# Patient Record
Sex: MALE | URBAN - METROPOLITAN AREA
[De-identification: names, ages, dates, MRNs, and addresses within clinical notes are randomized per-mention and may not be internally consistent; named-entity substitution may affect disease eponyms.]

---

## 2017-09-07 PROBLEM — 428262008 HISTORY OF MALIGNANT NEOPLASM OF PROSTATE: Status: ACTIVE | Noted: 2017-09-07

## 2017-09-07 PROBLEM — 197013009 RADIATION ENTEROCOLITIS: Status: ACTIVE | Noted: 2017-09-07

## 2017-09-07 PROBLEM — 266464001 HEMORRHAGE OF RECTUM AND ANUS: Status: ACTIVE | Noted: 2017-09-07

## 2017-09-07 PROBLEM — 238131007 OVERWEIGHT: Status: ACTIVE | Noted: 2017-09-07

## 2017-09-07 PROBLEM — 428283002 HISTORY OF POLYP OF COLON (SITUATION): Status: ACTIVE | Noted: 2017-09-07

## 2017-12-06 PROBLEM — 442405009 ULCER OF ANORECTAL STRUCTURE: Status: ACTIVE | Noted: 2017-12-06

## 2018-06-26 PROBLEM — 267024001 ABNORMAL WEIGHT LOSS: Status: ACTIVE | Noted: 2018-06-26

## 2018-06-26 PROBLEM — 442076002 EARLY SATIETY: Status: ACTIVE | Noted: 2018-06-26

## 2019-03-28 PROBLEM — 62315008 DIARRHEA: Status: ACTIVE | Noted: 2019-03-28

## 2022-04-30 ENCOUNTER — TELEPHONE ENCOUNTER (OUTPATIENT)
Dept: URBAN - METROPOLITAN AREA CLINIC 121 | Facility: CLINIC | Age: 87
End: 2022-04-30

## 2022-04-30 RX ORDER — CLARITHROMYCIN 500 MG/1
1 TABLET PO BID X 14 DAYS TABLET ORAL
OUTPATIENT
Start: 2018-07-11 | End: 2018-07-25

## 2022-04-30 RX ORDER — AMOXICILLIN 500 MG/1
2 TABLETS PO BID X 14 DAYS TABLET, FILM COATED ORAL
OUTPATIENT
Start: 2018-07-11 | End: 2018-07-25

## 2022-04-30 RX ORDER — OMEPRAZOLE 20 MG/1
1 CAPSULE PO BID X 14 DAYS CAPSULE, DELAYED RELEASE ORAL
OUTPATIENT
Start: 2018-07-11 | End: 2018-07-25

## 2022-05-01 ENCOUNTER — TELEPHONE ENCOUNTER (OUTPATIENT)
Dept: URBAN - METROPOLITAN AREA CLINIC 121 | Facility: CLINIC | Age: 87
End: 2022-05-01

## 2022-05-01 RX ORDER — CHOLECALCIFEROL (VITAMIN D3) 25 MCG
TABLET,CHEWABLE ORAL
Status: ACTIVE | COMMUNITY
Start: 2017-09-07

## 2022-05-01 RX ORDER — ATORVASTATIN CALCIUM 10 MG/1
TABLET, FILM COATED ORAL
Status: ACTIVE | COMMUNITY
Start: 2017-09-07

## 2022-05-01 RX ORDER — HYOSCYAMINE SULFATE 0.12 MG/1
1 TABLET PO Q6H PRN FOR DIARRHEA/ABD PAIN TABLET ORAL
Status: ACTIVE | COMMUNITY
Start: 2019-07-10

## 2022-05-01 RX ORDER — ASCORBIC ACID 500 MG
TABLET,CHEWABLE ORAL
Status: ACTIVE | COMMUNITY
Start: 2017-09-07

## 2022-05-01 RX ORDER — ASPIRIN 81 MG/1
TABLET, DELAYED RELEASE ORAL
Status: ACTIVE | COMMUNITY
Start: 2018-06-26

## 2022-08-27 ENCOUNTER — HOSPITAL ENCOUNTER (OUTPATIENT)
Dept: HOSPITAL 5 - ED | Age: 87
Setting detail: OBSERVATION
LOS: 1 days | Discharge: HOME | End: 2022-08-28
Attending: STUDENT IN AN ORGANIZED HEALTH CARE EDUCATION/TRAINING PROGRAM | Admitting: INTERNAL MEDICINE
Payer: MEDICARE

## 2022-08-27 DIAGNOSIS — Z79.899: ICD-10-CM

## 2022-08-27 DIAGNOSIS — Z86.73: ICD-10-CM

## 2022-08-27 DIAGNOSIS — R29.703: ICD-10-CM

## 2022-08-27 DIAGNOSIS — I63.9: ICD-10-CM

## 2022-08-27 DIAGNOSIS — D64.9: ICD-10-CM

## 2022-08-27 DIAGNOSIS — E83.51: ICD-10-CM

## 2022-08-27 DIAGNOSIS — Z98.890: ICD-10-CM

## 2022-08-27 DIAGNOSIS — E78.5: ICD-10-CM

## 2022-08-27 DIAGNOSIS — G45.9: Primary | ICD-10-CM

## 2022-08-27 LAB
APTT BLD: 30.1 SEC. (ref 24.2–36.6)
BASOPHILS # (AUTO): 0 K/MM3 (ref 0–0.1)
BASOPHILS NFR BLD AUTO: 0.1 % (ref 0–1.8)
BUN SERPL-MCNC: 19 MG/DL (ref 9–20)
BUN/CREAT SERPL: 19 %
CALCIUM SERPL-MCNC: 8.3 MG/DL (ref 8.4–10.2)
EOSINOPHIL # BLD AUTO: 0 K/MM3 (ref 0–0.4)
EOSINOPHIL NFR BLD AUTO: 0.4 % (ref 0–4.3)
HCT VFR BLD CALC: 24.6 % (ref 35.5–45.6)
HEMOLYSIS INDEX: 0
HGB BLD-MCNC: 8 GM/DL (ref 11.8–15.2)
INR PPP: 1.07 (ref 0.87–1.13)
LYMPHOCYTES # BLD AUTO: 0.7 K/MM3 (ref 1.2–5.4)
LYMPHOCYTES NFR BLD AUTO: 7.5 % (ref 13.4–35)
MCHC RBC AUTO-ENTMCNC: 32 % (ref 32–34)
MCV RBC AUTO: 83 FL (ref 84–94)
MONOCYTES # (AUTO): 0.6 K/MM3 (ref 0–0.8)
MONOCYTES % (AUTO): 6.7 % (ref 0–7.3)
PLATELET # BLD: 300 K/MM3 (ref 140–440)
RBC # BLD AUTO: 2.95 M/MM3 (ref 3.65–5.03)

## 2022-08-27 PROCEDURE — 85670 THROMBIN TIME PLASMA: CPT

## 2022-08-27 PROCEDURE — 82607 VITAMIN B-12: CPT

## 2022-08-27 PROCEDURE — 80048 BASIC METABOLIC PNL TOTAL CA: CPT

## 2022-08-27 PROCEDURE — G0378 HOSPITAL OBSERVATION PER HR: HCPCS

## 2022-08-27 PROCEDURE — 70498 CT ANGIOGRAPHY NECK: CPT

## 2022-08-27 PROCEDURE — 36415 COLL VENOUS BLD VENIPUNCTURE: CPT

## 2022-08-27 PROCEDURE — 83550 IRON BINDING TEST: CPT

## 2022-08-27 PROCEDURE — 82747 ASSAY OF FOLIC ACID RBC: CPT

## 2022-08-27 PROCEDURE — 85610 PROTHROMBIN TIME: CPT

## 2022-08-27 PROCEDURE — 96376 TX/PRO/DX INJ SAME DRUG ADON: CPT

## 2022-08-27 PROCEDURE — 96374 THER/PROPH/DIAG INJ IV PUSH: CPT

## 2022-08-27 PROCEDURE — 93005 ELECTROCARDIOGRAM TRACING: CPT

## 2022-08-27 PROCEDURE — 80053 COMPREHEN METABOLIC PANEL: CPT

## 2022-08-27 PROCEDURE — 70496 CT ANGIOGRAPHY HEAD: CPT

## 2022-08-27 PROCEDURE — 96361 HYDRATE IV INFUSION ADD-ON: CPT

## 2022-08-27 PROCEDURE — 85025 COMPLETE CBC W/AUTO DIFF WBC: CPT

## 2022-08-27 PROCEDURE — 99291 CRITICAL CARE FIRST HOUR: CPT

## 2022-08-27 PROCEDURE — 85730 THROMBOPLASTIN TIME PARTIAL: CPT

## 2022-08-27 PROCEDURE — 70450 CT HEAD/BRAIN W/O DYE: CPT

## 2022-08-27 RX ADMIN — FAMOTIDINE SCH MG: 10 INJECTION, SOLUTION INTRAVENOUS at 16:38

## 2022-08-27 RX ADMIN — Medication SCH ML: at 15:44

## 2022-08-27 RX ADMIN — Medication SCH ML: at 22:48

## 2022-08-27 RX ADMIN — Medication SCH: at 22:33

## 2022-08-27 RX ADMIN — FAMOTIDINE SCH MG: 10 INJECTION, SOLUTION INTRAVENOUS at 22:48

## 2022-08-27 NOTE — CONSULTATION
History of Present Illness


History of present illness: 





[facility] Consult Note





# Demographics


Consult Type: Acute Stroke Level 1 (0-4.5 hrs)





Patient Location: Emergency Room





First Name: Sandeep





Last Name: Camila





YOB: 1933





Age: 89





Gender: Male





Facility: [facility]





Time of Initial Page (Eastern Time): 08/27/2022, 10:58





Time of Return Call (Eastern Time): 08/27/2022, 10:58








# HPI


Chief Complaint:


altered mental state


weakness (focal)





History: 89M with recent stroke and distant stroke presents with left-sided 

weakness. Found by daughter on the toilet. EMS called; noted to have slurred 

speech and left sided weakness. Had stroke 1 month ago. Fingerstick 91. LKWT 

1000. Daughter took him to the bathroom to get him cleaned up, stopped moving 

left side normally and was not following commands. On Plavix and Lipitor.








# Scores


Time of exam and NIHSS (Eastern Time): 08/27/2022, 10:58





Level of Consciousness 1a: [0] = Alert; keenly responsive





LOC Questions 1b: [0] = Answers both questions correctly





LOC Commands 1c: [0] = Performs both tasks correctly





Best Gaze 2: [0] = Normal





Visual 3: [0] = No visual loss





Facial Palsy 4: [2] = Partial paralysis





Motor Arm Left 5a: [0] = No drift





Motor Arm Right 5b: [0] = No drift





Motor Leg Left 6a: [1] = Drift





Motor Leg Right 6b: [0] = No drift





Limb Ataxia 7: [0] = Absent





Sensory 8: [0] = Normal





Best Language 9: [0] = No aphasia





Dysarthria 10: [1] = Mild-to-moderate dysarthria





Extinction and Inattention 11: [1] = Visual, tactile, auditory, spatial, or 

personal inattention





NIHSS Total: 5








# Data


Time Head CT personally read by me (Eastern Time): 08/27/2022, 11:15





Head CT:


no bleed


preliminarily reviewed by me, please refer to radiology read for official 

reading


chronic R occipital stroke





CTA Head:


preliminarily reviewed by me, please refer to radiology read for official 

reading


right P1 occlusion





CTA Neck:


patent vessels


preliminarily reviewed by me, please refer to radiology read for official 

reading








# Assessment


Impression:


Ischemic Stroke (Acute)








# Plan


Thrombolytic/Intervention: NOT IV Thrombolysis or IA Intervention candidate





Thrombolytic Exclusion (< 3 hour window):


stroke within 3 months





Intraarterial Exclusion:


non-disabling


minor new symptoms





Target Blood Pressure:


SBP < 220


DBP < 105





Labs:


hemoglobin A1c


lipid panel





Imaging: (urgency: routine):


MRI Brain without contrast





Diagnostic Test:


echo without bubble study





Therapy/Evaluation:


PT/OT evaluation





Medication:





Atorvastatin 80





DVT Prophylaxis:


SCD


chemical DVT prophylaxis





Other:


LDL < 70


If patient has any neurological deterioration please call me back immediately


permissive hypertension


telemetry monitoring


I have discussed my recommendations with the referring provider





Disposition: admit








# Logistics


Telemedicine: Interactive 2 way audio and visual telecommunication technology 

was utilized during this visit








Electronically signed at 08/27/2022 12:29 (Eastern Time) by Sohan Kaufman MD





Medications and Allergies


                                    Allergies











Allergy/AdvReac Type Severity Reaction Status Date / Time


 


No Known Allergies Allergy   Unverified 08/27/22 11:02














Physical Examination





- Vital Signs


Vital Signs: 


                                   Vital Signs











Temp Pulse Resp BP Pulse Ox


 


 98.0 F   42 L  16   155/67   98 


 


 08/27/22 10:56  08/27/22 10:56  08/27/22 10:56  08/27/22 10:56  08/27/22 10:56

## 2022-08-27 NOTE — CAT SCAN REPORT
CTA neck without and with intravenous contrast material



CLINICAL HISTORY:



Left-sided weakness, dysarthria



TECHNIQUE:



Following acquisition of a timing bolus 0.625 mm thick contiguous axial scans were obtained from aort
ic arch to the skull base during rapid bolus intravenous contrast infusion. In addition to evaluation
 of axial source images multiplanar reconstructions were produced and reviewed for this report. 3 richard
ne MIP reconstructions were produced and reviewed.



Contrast dose report:



Omnipaque 350:  100 ml, administered intravenously



All CT examinations performed at this facility utilize modulated dose reduction, iterative reconstruc
tion or weight-based dosing, as appropriate, to obtain a radiation dose which is as low as can reason
ably be achieved.





FINDINGS:



Thoracic aorta:No abnormalities are identified along the course of the thoracic aorta..The origins of
 the great vessels have an unremarkable appearance. Brachiocephalic artery, left common carotid arter
y origin and left subclavian artery all have an unremarkable appearance.



Right carotid artery: Combination of calcified and soft plaque is present at the right carotid bifurc
ation extending into the proximal R ICA. Maximum degree of stenosis is on the order of less than 25%.
 Right common carotid artery and mid and distal cervical segments of the right internal carotid arter
y have an unremarkable appearance.



Left carotid artery: A combination of calcified and soft plaque is observed at the left carotid bulb 
extending into the proximal LICA. There is no associated stenosis. Right common carotid artery and mi
d and distal segments of the LICA have an unremarkable appearance.



Posterior circulation: Mild dominance of the left vertebral artery is noted. Calcified atheroscleroti
c plaque is seen at the origin of both vertebral arteries with no associated stenosis. The V1 and V2 
segments of the vertebral arteries have a normal appearance.



The degree of stenosis, if any, is determined utilizing NASCET like criteria.  In this case there is 
no indication of hemodynamically significant stenosis at the carotid bifurcations or elsewhere.



Evaluation of the nonvascular soft tissue structures reveal no abnormality. There is no indication of
 cervical lymphadenopathy. No abnormalities are seen along the course of the airway. Visualized porti
ons of the parotid glands and the submandibular salivary glands have a normal appearance. Thyroid gla
nd has a normal appearance.



A poorly marginated 14 x 13 x 10 mm groundglass lesion is present in the right upper lobe. Recommend 
baseline chest CT for complete evaluation of the lungs.



Evaluation of the cervical spine revealed no significant abnormalities.         



IMPRESSION:



1. No indication of hemodynamically significant stenosis at the carotid bifurcations or elsewhere.

2. Suggest baseline chest CT at this time.

3. Single incidental pulmonary nodule(s) in the right upper lobe measuring 10 x 14 x 13 mm with subso
lid (ground glass) characteristics. Recommendation according to Fleischner Society 2017 Guidelines: C
T at 6-12 months to confirm persistence, then CT every 2 years until 5 years.







CTA head with intravenous contrast

CLINICAL HISTORY:



Left-sided weakness, dysarthria



TECHNIQUE:



0.625  mm thick contiguous axial scans were obtained from the skull base to the skull vertex during r
apid bolus administration of intravenous contrast material. Multiplanar reconstructions were produced
 in the coronal and sagittal planes. In addition 3 plane MIP instructions were produced and reviewed 
for this report. The axial source images and reconstructed images were reviewed for this report.



CONTRAST DOSE REPORT:



Omnipaque 350: 100 ml administered intravenously.





All CT scans at this location are performed using CT dose reduction for ALARA by means of automated e
xposure control.



FINDINGS:



Internal carotid arteries: Calcified atherosclerotic plaque is seen along the course of the cavernous
 segments of both internal carotid arteries extending up into the communicating segments bilaterally.
 There is no associated hemodynamically significant stenosis.



Middle cerebral arteries:Normal and symmetrical M1 segments of the middle cerebral arteries are demon
strated. No abnormalities are seen on evaluation of the insular or opercular branches.



Anterior cerebral arteries:Bilaterally symmetrical A1 segments are demonstrated. No abnormalities are
 seen along the course of the A2 segments or their visualized pericallosal branches. A small anterior
 communicating artery is identified.



Vertebral arteries:Bilaterally symmetrical vertebral arteries are demonstrated. Both vertebral arteri
es contribute to the basilar artery origin.



Basilar artery:Basilar artery has an unremarkable appearance.



Posterior cerebral arteries:Bilaterally  symmetrical posterior cerebral arteries are identified. A le
ft-sided posterior communicating artery is identified.



[place]:Not intact. see above.



Dural sinuses: Dural venous sinuses are well demonstrated on this exam. There is no evidence of dural
 sinus thrombosis.



IMPRESSION:





1. No indication of hemodynamically significant intracranial stenosis or large vessel occlusion.

















Signer Name: Emanuel Zhang MD 

Signed: 8/27/2022 11:59 AM

Workstation Name: [id number]

## 2022-08-27 NOTE — CAT SCAN REPORT
CT HEAD WITHOUT CONTRAST



INDICATION / CLINICAL INFORMATION:

Left-sided weakness, dysarthria with EMS.



TECHNIQUE:

All CT scans at this location are performed using CT dose reduction for ALARA by means of automated e
xposure control. 



COMPARISON:

None available.



FINDINGS:

HEMORRHAGE: No evidence of intracranial hemorrhage or extra-axial fluid collection.

EXTRA-AXIAL SPACES: Cortical sulci and sylvian fissures are enlarged reflecting a degree of parenchym
al volume loss which is within normal limits for the patient's age of 89 years. Basilar cisterns have
 an unremarkable appearance.

VENTRICULAR SYSTEM: The third and lateral ventricles are enlarged reflecting presence of age related 
parenchymal volume loss.

CEREBRAL PARENCHYMA: Periventricular and deep white matter lucency is observed. This is probably seco
ndary to microvascular ischemic change. Well-circumscribed foci of decreased brain parenchymal attenu
ation are identified in the gangliocapsular regions bilaterally. In addition well-circumscribed lucen
cies are present in the right and left thalami. A similar finding is present in the mid manny. These f
indings likely reflect simple remote small deep infarctions. Additionally noted is encephalomalacia a
long the medial aspect of the right occipital lobe secondary to remote right posterior cerebral arter
y infarction. If acute infarction is of clinical concern consider MRI brain with diffusion-weighted i
maging for further evaluation.

MIDLINE SHIFT OR HERNIATION: There is no mass effect.

CEREBELLUM / BRAINSTEM: Brainstem has an unremarkable appearance. Age related cerebellar atrophy is n
oted.

MIDLINE STRUCTURES:Pituitary gland has an unremarkable appearance. No abnormalities are seen in the p
ineal region.

INTRACRANIAL VESSELS:Calcified atherosclerotic plaque is present along the course of the cavernous se
gments of both internal carotid arteries. Similar findings are seen at the distal vertebral arteries.


CRANIOCERVICAL JUNCTION:No significant abnormality.

ORBITS: visualized portions of the orbits have an unremarkable appearance.

SOFT TISSUES of HEAD: No significant abnormality.

CALVARIUM: Evaluation of bone windows reveals no abnormalities.

PARANASAL SINUSES / MASTOID AIR CELLS: Paranasal sinuses are free from inflammatory mucosal disease. 
Mastoid air cells are normally pneumatized.



IMPRESSION:

1. 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
al lobe.





2. Multifocal bilateral thalamic and basal ganglia small deep infarctions. These are likely remote.





3. If acute infarction is of clinical concern consider MRI brain with diffusion weighted imaging for 
further evaluation.





============================================

CODE STROKE:

Time of Communication (CST/CDT): 1035 Central standard time

Licensed Practitioner Receiving Report: Dr. Tony of the [facility] emergency de
partment. 







============================================



Signer Name: Emanuel Zhang MD 

Signed: 8/27/2022 11:37 AM

Workstation Name: [id number]

## 2022-08-27 NOTE — EMERGENCY DEPARTMENT REPORT
ED Neuro Deficit HPI





- General


Chief Complaint: Neuro Symptoms/Deficit


Stated Complaint: STROKE


Time Seen by Provider: 08/27/22 11:15


Source: family, EMS


Mode of arrival: Stretcher


Limitations: Physical Limitation





- History of Present Illness


Initial Comments: 











As per daughter





89-year-old male with a past medical history of a CVA affecting left eye vision 

many years ago and CVA 1 month ago [place] [facility] [place] to the hospital with 

complaints of strokelike symptoms.  Daughter states patient ambulated normally 

to the bathroom.  She was assisting patient and around 10 AM he began to have 

difficulty standing and became less responsive.  She reports garbled speech and 

left-sided weakness.  Patient had a CVA 1 month ago which he presented with ab

normal speech and was noted to have some left-sided weakness.  Since then 

patient has deteriorated from using a cane to a walker and he has undergone 

several rounds of physical and occupational therapy since discharge from the 

hospital.  Patient was placed on Lipitor and Plavix and has been compliant.  He 

currently is with his daughter since recent stroke.  Patient denies any pain.  

EMS reports left-sided weakness upon their arrival but symptoms are improving











- Related Data


Allergies/Adverse Reactions: 


                                    Allergies











Allergy/AdvReac Type Severity Reaction Status Date / Time


 


No Known Allergies Allergy   Unverified 08/27/22 11:02














ED Review of Systems


ROS: 


Stated complaint: STROKE


Other details as noted in HPI





Comment: All other systems reviewed and negative





ED Neuro Physical Exam





- General


Limitations: Physical Limitation


Suspected Stroke: Yes





- NIHSS


Assessment Interval: Baseline


1a. Level of Consciousness: alert/keenly responsive


1b. LOC Questions: answers both correctly


1c. LOC Commands: performs tasks correctly


2. Best Gaze: normal


3. Visual: no visual loss


4. Facial Palsy: minor paralysis


5b. Motor Arm Right: no drift


5a. Motor Arm Left: no drift


6a. Motor Leg Left: drift


6b. Motor Leg Right: no drift


7. Limb Ataxia: absent


8. Sensory: normal


9. Best Language: no aphasia


10. Dysarthria: mild/moderate dysarthria


11. Extinction/Inattention: no abnormality


Total Score: 3


Stroke Severity: Minor Stroke





- Other


Other exam information: 





General: No acute distress


Head: Atraumatic


Eyes: normal appearance


ENT: Moist mucous membranes


Neck: Normal appearance, no midline tenderness


Chest: Clear to auscultation bilaterally


CV: Regular rate and rhythm


Abdomen: Soft, normal bowel sounds, nontender, nondistended, no rebound or 

guarding


Back: Normal inspection


Extremity: Normal inspection, full range of motion


Neuro: Alert O x 3, no facial asymmetry, speech clear, no gross motor sensory 

deficit


Psych: Appropriate behavior


Skin: No rash





ED Course


                                   Vital Signs











  08/27/22 08/27/22 08/27/22





  10:56 11:45 11:48


 


Temperature 98.0 F  97.8 F


 


Pulse Rate 42 L 73 67


 


Respiratory 16 13 29 H





Rate   


 


Blood Pressure   


 


Blood Pressure 155/67  151/70





[Right]   


 


O2 Sat by Pulse 98  98





Oximetry   














  08/27/22 08/27/22 08/27/22





  11:50 12:01 12:15


 


Temperature   


 


Pulse Rate  61 58 L


 


Respiratory 26 H 27 H 26 H





Rate   


 


Blood Pressure  151/70 164/80


 


Blood Pressure   





[Right]   


 


O2 Sat by Pulse 98 98 96





Oximetry   














  08/27/22 08/27/22 08/27/22





  12:31 12:45 13:01


 


Temperature   


 


Pulse Rate 66 59 L 58 L


 


Respiratory 30 H 25 H 24





Rate   


 


Blood Pressure 164/80 159/71 159/71


 


Blood Pressure   





[Right]   


 


O2 Sat by Pulse 99 99 99





Oximetry   














  08/27/22





  13:15


 


Temperature 


 


Pulse Rate 58 L


 


Respiratory 26 H





Rate 


 


Blood Pressure 162/75


 


Blood Pressure 





[Right] 


 


O2 Sat by Pulse 100





Oximetry 














- Consultations


Consultation #1: 





08/27/22 11:38


Case d/w Dr Parry neurologist. Pt's daughter at the bedside








- Lab Data


Result diagrams: 


                                 08/27/22 12:35





                                 08/27/22 12:35


                                   Lab Results











  08/27/22 08/27/22 08/27/22 Range/Units





  12:35 12:35 12:35 


 


WBC  9.5    (4.5-11.0)  K/mm3


 


RBC  2.95 L    (3.65-5.03)  M/mm3


 


Hgb  8.0 L    (11.8-15.2)  gm/dl


 


Hct  24.6 L    (35.5-45.6)  %


 


MCV  83 L    (84-94)  fl


 


MCH  27 L    (28-32)  pg


 


MCHC  32    (32-34)  %


 


RDW  15.6 H    (13.2-15.2)  %


 


Plt Count  300    (140-440)  K/mm3


 


Lymph % (Auto)  7.5 L    (13.4-35.0)  %


 


Mono % (Auto)  6.7    (0.0-7.3)  %


 


Eos % (Auto)  0.4    (0.0-4.3)  %


 


Baso % (Auto)  0.1    (0.0-1.8)  %


 


Lymph # (Auto)  0.7 L    (1.2-5.4)  K/mm3


 


Mono # (Auto)  0.6    (0.0-0.8)  K/mm3


 


Eos # (Auto)  0.0    (0.0-0.4)  K/mm3


 


Baso # (Auto)  0.0    (0.0-0.1)  K/mm3


 


Seg Neutrophils %  85.3 H    (40.0-70.0)  %


 


Seg Neutrophils #  8.1 H    (1.8-7.7)  K/mm3


 


PT   15.1 H   (12.2-14.9)  Sec.


 


INR   1.07   (0.87-1.13)  


 


APTT   30.1   (24.2-36.6)  Sec.


 


Thrombin Time   18.5   (15.1-19.6)  Sec.


 


Sodium    142  (137-145)  mmol/L


 


Potassium    4.2  (3.6-5.0)  mmol/L


 


Chloride    108.4 H  ()  mmol/L


 


Carbon Dioxide    25  (22-30)  mmol/L


 


Anion Gap    13  mmol/L


 


BUN    19  (9-20)  mg/dL


 


Creatinine    1.0  (0.8-1.3)  mg/dL


 


Estimated GFR    > 60  ml/min


 


BUN/Creatinine Ratio    19  %


 


Glucose    94  ()  mg/dL


 


Calcium    8.3 L  (8.4-10.2)  mg/dL














- EKG Data


-: EKG Interpreted by Me


EKG shows normal: sinus rhythm, ST-T waves (No STEMI)





- Radiology Data


Radiology results: report reviewed


CT HEAD WITHOUT CONTRAST  


 


 INDICATION / CLINICAL INFORMATION:  


 Left-sided weakness, dysarthria with EMS.  


 


 TECHNIQUE:  


 All CT scans at this location are performed using CT dose reduction for ALARA 

by means of automated


exposure control.   


 


 COMPARISON:  


 None available.  


 


 FINDINGS:  


 HEMORRHAGE: No evidence of intracranial hemorrhage or extra-axial fluid 

collection.  


 EXTRA-AXIAL SPACES: Cortical sulci and sylvian fissures are enlarged reflecting

 a degree of 


parenchymal volume loss which is within normal limits for the patient's age of 

89 years. Basilar 


cisterns have an unremarkable appearance.  


 VENTRICULAR SYSTEM: The third and lateral ventricles are enlarged reflecting 

presence of age 


related parenchymal volume loss.  


 CEREBRAL PARENCHYMA: Periventricular and deep white matter lucency is observed.

 This is probably 


secondary to microvascular ischemic change. Well-circumscribed foci of decreased

 brain parenchymal 


attenuation are identified in the gangliocapsular regions bilaterally. In 

addition well-


circumscribed lucencies are present in the right and left thalami. A similar 

finding is present in 


the mid manny. These findings likely reflect simple remote small deep 

infarctions. Additionally noted


is encephalomalacia along the medial aspect of the right occipital lobe 

secondary to remote right 


posterior cerebral artery infarction. If acute infarction is of clinical concern

 consider MRI brain 


with diffusion-weighted imaging for further evaluation.  


 MIDLINE SHIFT OR HERNIATION: There is no mass effect.  


 CEREBELLUM / BRAINSTEM: Brainstem has an unremarkable appearance. Age related 

cerebellar atrophy is


noted.  


 MIDLINE STRUCTURES:Pituitary gland has an unremarkable appearance. No 

abnormalities are seen in the


pineal region.  


 INTRACRANIAL VESSELS:Calcified atherosclerotic plaque is present along the 

course of the cavernous 


segments of both internal carotid arteries. Similar findings are seen at the dis

stacy vertebral 


arteries.  


 CRANIOCERVICAL JUNCTION:No significant abnormality.  


 ORBITS: visualized portions of the orbits have an unremarkable appearance.  


 SOFT TISSUES of HEAD: No significant abnormality.  


 CALVARIUM: Evaluation of bone windows reveals no abnormalities.  


 PARANASAL SINUSES / MASTOID AIR CELLS: Paranasal sinuses are free from 

inflammatory mucosal 


disease. Mastoid air cells are normally pneumatized.  


 


 IMPRESSION:  


 1. Remote right posterior cerebral artery infarction involving the medial 

aspect of the right 


occipital lobe.  


 


 


 2. Multifocal bilateral thalamic and basal ganglia small deep infarctions. 

These are likely remote.  


 


 


 3. If acute infarction is of clinical concern consider MRI brain with diffusion

 weighted imaging 


for further evaluation.  


 


 


 ============================================  


 CODE STROKE:  


 Time of Communication (CST/CDT): 1035 Central standard time  


 Licensed Practitioner Receiving Report: Dr. Tony of the [facility] emergency 


department.   


 


 CTA neck without and with intravenous contrast material  


 


 CLINICAL HISTORY:  


 


 Left-sided weakness, dysarthria  


 


 TECHNIQUE:  


 


 Following acquisition of a timing bolus 0.625 mm thick contiguous axial scans 

were obtained from 


aortic arch to the skull base during rapid bolus intravenous contrast infusion. 

In addition to 


evaluation of axial source images multiplanar reconstructions were produced and 

reviewed for this 


report. 3 plane MIP reconstructions were produced and reviewed.  


 


 Contrast dose report:  


 


 Omnipaque 350:  100 ml, administered intravenously  


 


 All CT examinations performed at this facility utilize modulated dose re

duction, iterative 


reconstruction or weight-based dosing, as appropriate, to obtain a radiation dos

e which is as low as


can reasonably be achieved.  


 


 


 FINDINGS:  


 


 Thoracic aorta:No abnormalities are identified along the course of the thoracic

 aorta..The origins 


of the great vessels have an unremarkable appearance. Brachiocephalic artery, 

left common carotid 


artery origin and left subclavian artery all have an unremarkable appearance.  


 


 Right carotid artery: Combination of calcified and soft plaque is present at 

the right carotid 


bifurcation extending into the proximal R ICA. Maximum degree of stenosis is on 

the order of less 


than 25%. Right common carotid artery and mid and distal cervical segments of 

the right internal 


carotid artery have an unremarkable appearance.  


 


 Left carotid artery: A combination of calcified and soft plaque is observed at 

the left carotid 


bulb extending into the proximal LICA. There is no associated stenosis. Right 

common carotid artery 


and mid and distal segments of the LICA have an unremarkable appearance.  


 


 Posterior circulation: Mild dominance of the left vertebral artery is noted. 

Calcified 


atherosclerotic plaque is seen at the origin of both vertebral arteries with no 

associated stenosis.


The V1 and V2 segments of the vertebral arteries have a normal appearance.  


 


 The degree of stenosis, if any, is determined utilizing NASCET like criteria.  

In this case there 


is no indication of hemodynamically significant stenosis at the carotid 

bifurcations or elsewhere.  


 


 Evaluation of the nonvascular soft tissue structures reveal no abnormality. 

There is no indication 


of cervical lymphadenopathy. No abnormalities are seen along the course of the 

airway. Visualized 


portions of the parotid glands and the submandibular salivary glands have a 

normal appearance. 


Thyroid gland has a normal appearance.  


 


 A poorly marginated 14 x 13 x 10 mm groundglass lesion is present in the right 

upper lobe. 


Recommend baseline chest CT for complete evaluation of the lungs.  


 


 Evaluation of the cervical spine revealed no significant abnormalities.        

   


 


 IMPRESSION:  


 


 1. No indication of hemodynamically significant stenosis at the carotid 

bifurcations or elsewhere.  


 2. Suggest baseline chest CT at this time.  


 3. Single incidental pulmonary nodule(s) in the right upper lobe measuring 10 x

 14 x 13 mm with 


subsolid (ground glass) characteristics. Recommendation according to Fleischner 

Society 2017 


Guidelines: CT at 6-12 months to confirm persistence, then CT every 2 years 

until 5 years.  


 


 


 


 CTA head with intravenous contrast  


 CLINICAL HISTORY:  


 


 Left-sided weakness, dysarthria  


 


 TECHNIQUE:  


 


 0.625  mm thick contiguous axial scans were obtained from the skull base to the

 skull vertex during


rapid bolus administration of intravenous contrast material. Multiplanar 

reconstructions were 


produced in the coronal and sagittal planes. In addition 3 plane MIP 

instructions were produced and 


reviewed for this report. The axial source images and reconstructed images were 

reviewed for this 


report.  


 


 CONTRAST DOSE REPORT:  


 


 Omnipaque 350: 100 ml administered intravenously.  


 


 


 All CT scans at this location are performed using CT dose reduction for ALARA 

by means of automated


exposure control.  


 


 FINDINGS:  


 


 Internal carotid arteries: Calcified atherosclerotic plaque is seen along the 

course of the 


cavernous segments of both internal carotid arteries extending up into the 

communicating segments 


bilaterally. There is no associated hemodynamically significant stenosis.  


 


 Middle cerebral arteries:Normal and symmetrical M1 segments of the middle 

cerebral arteries are 


demonstrated. No abnormalities are seen on evaluation of the insular or 

opercular branches.  


 


 Anterior cerebral arteries:Bilaterally symmetrical A1 segments are 

demonstrated. No abnormalities 


are seen along the course of the A2 segments or their visualized pericallosal 

branches. A small 


anterior communicating artery is identified.  


 


 Vertebral arteries:Bilaterally symmetrical vertebral arteries are demonstrated.

 Both vertebral 


arteries contribute to the basilar artery origin.  


 


 Basilar artery:Basilar artery has an unremarkable appearance.  


 


 Posterior cerebral arteries:Bilaterally  symmetrical posterior cerebral 

arteries are identified. A 


left-sided posterior communicating artery is identified.  


 


 [place]:Not intact. see above.  


 


 Dural sinuses: Dural venous sinuses are well demonstrated on this exam. There 

is no evidence of 


dural sinus thrombosis.  


 


 IMPRESSION:  


 


 


 1. No indication of hemodynamically significant intracranial stenosis or large 

vessel occlusion.  


 


 


 


 





- Medical Decision Making





89-year-old male with sudden onset of strokelike symptoms at approximately 10 

AM.  It does appear the patient has some residual deficits status post recent 

CVA with increased debility requiring walker use.  Patient recently completed 

occupational physical therapy.  Symptoms worsen suddenly at 10 AM as witnessed 

by his daughter at the bedside.  Patient not a tPA candidate secondary to recent

 stroke 1 month ago.  CT head, CT angio head and neck are reviewed.  Neurologist

 consult appreciated.  Please refer to note for further work-up recommendations





- Thrombolytic Inclusion/Exclusion


Thrombolytic Contraindications: Stroke in Past 3 Months


Critical Care Time: Yes


Critical care time in (mins) excluding proc time.: 35


Critical care attestation.: 


If time is entered above; I have spent that time in minutes in the direct care 

of this critically ill patient, excluding procedure time.





Critical Care Time: 





 35 Minutes of critical care time excluding procedures were used in the care of 

the patient.   I came immediately to the bedside upon patient's arrival.  I 

obtained history from EMS at the bedside. I discussed treatment plan with the 

nursing team members. I reviewed electronic record.  I spoke with family to 

obtain medical history. Patient required multiple interventions and 

reassessments.  Spoke with hospitalist and consultants for collaborative care





ED Disposition


Clinical Impression: 


 Stroke syndrome, Anemia, History of CVA (cerebrovascular accident)





Disposition: 09 ADMITTED AS INPATIENT


Is pt being admited?: Yes


Does the pt Need Aspirin: Yes


Condition: Stable


Time of Disposition: 14:08 (Dr Pack/hospitalist)

## 2022-08-27 NOTE — CAT SCAN REPORT
CTA neck without and with intravenous contrast material



CLINICAL HISTORY:



Left-sided weakness, dysarthria



TECHNIQUE:



Following acquisition of a timing bolus 0.625 mm thick contiguous axial scans were obtained from aort
ic arch to the skull base during rapid bolus intravenous contrast infusion. In addition to evaluation
 of axial source images multiplanar reconstructions were produced and reviewed for this report. 3 richard
ne MIP reconstructions were produced and reviewed.



Contrast dose report:



Omnipaque 350:  100 ml, administered intravenously



All CT examinations performed at this facility utilize modulated dose reduction, iterative reconstruc
tion or weight-based dosing, as appropriate, to obtain a radiation dose which is as low as can reason
ably be achieved.





FINDINGS:



Thoracic aorta:No abnormalities are identified along the course of the thoracic aorta..The origins of
 the great vessels have an unremarkable appearance. Brachiocephalic artery, left common carotid arter
y origin and left subclavian artery all have an unremarkable appearance.



Right carotid artery: Combination of calcified and soft plaque is present at the right carotid bifurc
ation extending into the proximal R ICA. Maximum degree of stenosis is on the order of less than 25%.
 Right common carotid artery and mid and distal cervical segments of the right internal carotid arter
y have an unremarkable appearance.



Left carotid artery: A combination of calcified and soft plaque is observed at the left carotid bulb 
extending into the proximal LICA. There is no associated stenosis. Right common carotid artery and mi
d and distal segments of the LICA have an unremarkable appearance.



Posterior circulation: Mild dominance of the left vertebral artery is noted. Calcified atheroscleroti
c plaque is seen at the origin of both vertebral arteries with no associated stenosis. The V1 and V2 
segments of the vertebral arteries have a normal appearance.



The degree of stenosis, if any, is determined utilizing NASCET like criteria.  In this case there is 
no indication of hemodynamically significant stenosis at the carotid bifurcations or elsewhere.



Evaluation of the nonvascular soft tissue structures reveal no abnormality. There is no indication of
 cervical lymphadenopathy. No abnormalities are seen along the course of the airway. Visualized porti
ons of the parotid glands and the submandibular salivary glands have a normal appearance. Thyroid gla
nd has a normal appearance.



A poorly marginated 14 x 13 x 10 mm groundglass lesion is present in the right upper lobe. Recommend 
baseline chest CT for complete evaluation of the lungs.



Evaluation of the cervical spine revealed no significant abnormalities.         



IMPRESSION:



1. No indication of hemodynamically significant stenosis at the carotid bifurcations or elsewhere.

2. Suggest baseline chest CT at this time.

3. Single incidental pulmonary nodule(s) in the right upper lobe measuring 10 x 14 x 13 mm with subso
lid (ground glass) characteristics. Recommendation according to Fleischner Society 2017 Guidelines: C
T at 6-12 months to confirm persistence, then CT every 2 years until 5 years.







CTA head with intravenous contrast

CLINICAL HISTORY:



Left-sided weakness, dysarthria



TECHNIQUE:



0.625  mm thick contiguous axial scans were obtained from the skull base to the skull vertex during r
apid bolus administration of intravenous contrast material. Multiplanar reconstructions were produced
 in the coronal and sagittal planes. In addition 3 plane MIP instructions were produced and reviewed 
for this report. The axial source images and reconstructed images were reviewed for this report.



CONTRAST DOSE REPORT:



Omnipaque 350: 100 ml administered intravenously.





All CT scans at this location are performed using CT dose reduction for ALARA by means of automated e
xposure control.



FINDINGS:



Internal carotid arteries: Calcified atherosclerotic plaque is seen along the course of the cavernous
 segments of both internal carotid arteries extending up into the communicating segments bilaterally.
 There is no associated hemodynamically significant stenosis.



Middle cerebral arteries:Normal and symmetrical M1 segments of the middle cerebral arteries are demon
strated. No abnormalities are seen on evaluation of the insular or opercular branches.



Anterior cerebral arteries:Bilaterally symmetrical A1 segments are demonstrated. No abnormalities are
 seen along the course of the A2 segments or their visualized pericallosal branches. A small anterior
 communicating artery is identified.



Vertebral arteries:Bilaterally symmetrical vertebral arteries are demonstrated. Both vertebral arteri
es contribute to the basilar artery origin.



Basilar artery:Basilar artery has an unremarkable appearance.



Posterior cerebral arteries:Bilaterally  symmetrical posterior cerebral arteries are identified. A le
ft-sided posterior communicating artery is identified.



[place]:Not intact. see above.



Dural sinuses: Dural venous sinuses are well demonstrated on this exam. There is no evidence of dural
 sinus thrombosis.



IMPRESSION:





1. No indication of hemodynamically significant intracranial stenosis or large vessel occlusion.

















Signer Name: Emanuel Zhang MD 

Signed: 8/27/2022 11:59 AM

Workstation Name: [id number]

## 2022-08-28 VITALS — SYSTOLIC BLOOD PRESSURE: 130 MMHG | DIASTOLIC BLOOD PRESSURE: 57 MMHG

## 2022-08-28 LAB
ALBUMIN SERPL-MCNC: 2.6 G/DL (ref 3.9–5)
ALT SERPL-CCNC: 36 UNITS/L (ref 7–56)
BASOPHILS # (AUTO): 0 K/MM3 (ref 0–0.1)
BASOPHILS NFR BLD AUTO: 0.2 % (ref 0–1.8)
BUN SERPL-MCNC: 17 MG/DL (ref 9–20)
BUN/CREAT SERPL: 17 %
CALCIUM SERPL-MCNC: 8.2 MG/DL (ref 8.4–10.2)
EOSINOPHIL # BLD AUTO: 0.1 K/MM3 (ref 0–0.4)
EOSINOPHIL NFR BLD AUTO: 0.7 % (ref 0–4.3)
HCT VFR BLD CALC: 23.5 % (ref 35.5–45.6)
HEMOLYSIS INDEX: 0
HGB BLD-MCNC: 7.6 GM/DL (ref 11.8–15.2)
IRON SERPL-MCNC: 14 UG/DL (ref 49–181)
LYMPHOCYTES # BLD AUTO: 0.9 K/MM3 (ref 1.2–5.4)
LYMPHOCYTES NFR BLD AUTO: 10 % (ref 13.4–35)
MCHC RBC AUTO-ENTMCNC: 32 % (ref 32–34)
MCV RBC AUTO: 82 FL (ref 84–94)
MONOCYTES # (AUTO): 0.8 K/MM3 (ref 0–0.8)
MONOCYTES % (AUTO): 8.4 % (ref 0–7.3)
PLATELET # BLD: 292 K/MM3 (ref 140–440)
RBC # BLD AUTO: 2.88 M/MM3 (ref 3.65–5.03)
TIBC SERPL-MCNC: 123 MCG/DL (ref 250–450)

## 2022-08-28 RX ADMIN — FAMOTIDINE SCH MG: 10 INJECTION, SOLUTION INTRAVENOUS at 12:10

## 2022-08-28 RX ADMIN — Medication SCH ML: at 12:12

## 2022-08-28 NOTE — DISCHARGE SUMMARY
Providers





- Providers


Date of Admission: 


08/27/22 14:09





Date of discharge: 08/28/22


Attending physician: 


FERNY YODER MD





                                        





08/27/22 14:57


Consult to Physician [CONS] Routine 


   Comment: 


   Consulting Provider: LINDA BISHOP


   Physician Instructions: 


   Reason For Exam: TIA











Primary care physician: 


TAMMY YODER








Hospitalization


Reason for admission: TIAruled out, recrudescence


Condition: Stable


Pertinent studies: 





Reviewed.


Procedures: 





None.


Hospital course: 





The patient is an 89-year-old male with a past medical history of a CVA 

affecting left eye vision many years ago and [place] 1 [place] [facility] [place]resents to the hospital with complaints of strokelike symptoms.  Daughter 

states patient ambulated normally to the bathroom.  She was assisting patient 

and around 10 AM he began to have difficulty standing and became less 

responsive.  She reports garbled speech and left-sided weakness.  Patient had a 

CVA 1 month ago which he presented with abnormal speech and was noted to have 

some left-sided weakness.  Since then patient has deteriorated from using a cane

 to a walker and he has undergone several rounds of physical and occupational 

therapy since discharge from the hospital.  Patient was placed on Lipitor and 

Plavix and has been compliant.  He currently is with his daughter since recent 

stroke.  Patient denies any pain.  On presentation to the ED, the patient was 

found to be hemodynamically stable.  Patient underwent CT head noncontrast 

revealing "remote right posterior cerebral artery infarction involving the 

medial aspect of the right occipital lobe; multifocal bilateral thalamic and 

basal ganglia small deep infarctions that are likely remote".  On further 

evaluation the patient's symptoms have since returned back to his baseline. 

Teleneurology was consulted, and they did not recommend the administration of 

tPA.  The patient's symptoms might have been secondary to recrudescence as 

opposed to TIA versus acute ischemic CVA.  Patient is medically clear for 

discharge.


Disposition: 01 HOME / SELF CARE / HOMELESS


Final Discharge Diagnosis (Prints w/discharge instructions): TIAruled out, 

recrudescence, hyperlipidemia, chronic anemia, old cerebrovascular accident, 

hypocalcemia


Time spent for discharge: 45 min 





Core Measure Documentation





- Palliative Care


Palliative Care/ Comfort Measures: Not Applicable





- Core Measures


Any of the following diagnoses?: history only





Exam





- Constitutional


Vitals: 


                                        











Temp Pulse Resp BP Pulse Ox


 


 99.0 F   78   22   130/57   97 


 


 08/28/22 04:30  08/28/22 04:30  08/28/22 04:30  08/28/22 04:30  08/28/22 04:30











General appearance: Present: no acute distress, well-nourished





- EENT


Eyes: Present: PERRL, EOM intact


ENT: hearing intact, clear oral mucosa, dentition normal





- Neck


Neck: Present: supple, normal ROM





- Respiratory


Respiratory effort: normal


Respiratory: bilateral: CTA





- Cardiovascular


Rhythm: regular


Heart Sounds: Present: S1 & S2





- Extremities


Extremities: no ischemia, pulses intact, pulses symmetrical, No edema, normal 

temperature, normal color


Peripheral Pulses: within normal limits





- Abdominal


General gastrointestinal: Present: soft, non-tender, non-distended, normal bowel

 sounds


Male genitourinary: Present: deferred





- Rectal


Rectal Exam: deferred





- Integumentary


Integumentary: Present: clear, warm, dry





- Musculoskeletal


Musculoskeletal: strength equal bilaterally





- Psychiatric


Psychiatric: appropriate mood/affect, memory intact, cooperative





- Neurologic


Neurologic: CNII-XII intact, moves all extremities





- Allied Health


Allied health notes reviewed: nursing





Plan


Activity: advance as tolerated


Diet: low salt


Additional Instructions: The patient is an 89-year-old male with a past medical 

history of a CVA affecting left eye vision many years ago and CVA 1 month ago 

[place] [facility] [place] hospital with complaints of strokelike symptoms. 

 Daughter states patient ambulated normally to the bathroom.  She was assisting 

patient and around 10 AM he began to have difficulty standing and became less 

responsive.  She reports garbled speech and left-sided weakness.  Patient had a 

CVA 1 month ago which he presented with abnormal speech and was noted to have 

some left-sided weakness.  Since then patient has deteriorated from using a cane

 to a walker and he has undergone several rounds of physical and occupational 

therapy since discharge from the hospital.  Patient was placed on Lipitor and 

Plavix and has been compliant.  He currently is with his daughter since recent 

stroke.  Patient denies any pain.  On presentation to the ED, the patient was 

found to be hemodynamically stable.  Patient underwent CT head noncontrast 

revealing "remote right posterior cerebral artery infarction involving the 

medial aspect of the right occipital lobe; multifocal bilateral thalamic and 

basal ganglia small deep infarctions that are likely remote".  On further 

evaluation the patient's symptoms have since returned back to his baseline. 

Teleneurology was consulted, and they did not recommend the administration of 

tPA.  The patient's symptoms might have been secondary to recrudescence as opp

osed to TIA versus acute ischemic CVA.  Patient is medically clear for 

discharge.


Care Plan Goals: 


Patient is medically clear for discharge.


Assessment: 


The patient is an 89-year-old male with a past medical history of a CVA 

affecting left eye vision many years ago and [place]A 1 month ago [place] [facility] [place] 

presents to the hospital with complaints of strokelike symptoms.  Daughter 

states patient ambulated normally to the bathroom.  She was assisting patient 

and around 10 AM he began to have difficulty standing and became less 

responsive.  She reports garbled speech and left-sided weakness.  Patient had a 

CVA 1 month ago which he presented with abnormal speech and was noted to have 

some left-sided weakness.  Since then patient has deteriorated from using a cane

 to a walker and he has undergone several rounds of physical and occupational 

therapy since discharge from the hospital.  Patient was placed on Lipitor and 

Plavix and has been compliant.  He currently is with his daughter since recent 

stroke.  Patient denies any pain.  On presentation to the ED, the patient was 

found to be hemodynamically stable.  Patient underwent CT head noncontrast 

revealing "remote right posterior cerebral artery infarction involving the 

medial aspect of the right occipital lobe; multifocal bilateral thalamic and 

basal ganglia small deep infarctions that are likely remote".  On further 

evaluation the patient's symptoms have since returned back to his baseline. 

Teleneurology was consulted, and they did not recommend the administration of 

tPA.  The patient's symptoms might have been secondary to recrudescence as 

opposed to TIA versus acute ischemic CVA.  Patient is medically clear for 

discharge.


Follow up with: 


TAMMY YODER MD [Primary Care Provider] - 7 Days

## 2022-08-28 NOTE — ELECTROCARDIOGRAPH REPORT
[facility]

                                       

Test Date:    2022               Test Time:    11:36:26

Pat Name:     NIURKA SAENZ               Department:   

Patient ID:   [id number]          Room:         A467

Gender:       M                        Technician:   [id number]

:          1933               Requested By: LULY SHAY

Order Number: [id number]             Reading MD:   Ra Burris

                                 Measurements

Intervals                              Axis          

Rate:         71                       P:            44

SC:           158                      QRS:          56

QRSD:         83                       T:            43

QT:           436                                    

QTc:          473                                    

                           Interpretive Statements

Sinus rhythm

No previous ECG available for comparison

Electronically Signed On 2022 9:36:47 EDT by Ra Burris

## 2022-08-28 NOTE — HISTORY AND PHYSICAL REPORT
History of Present Illness


Date of examination: 08/27/22


Date of admission: 


08/27/22 14:09





Chief complaint: 


Left-sided weakness since a.m.





History of present illness: 


89-year-old male with past medical history of cerebrovascular accident 1 month 

ago.  Was admitted to [facility] 1 month ago for left-sided 

weakness.  Patient was treated and discharged on Plavix aspirin and Lipitor.  

Patient is able to walk with the help of her walker.  This a.m. daughter noticed

weakness on the left side and dysarthria because of which EMS was called.  As 

per EMS patient had left-sided weakness.  During my exam his left-sided weakness

is improved back to normal and there was no dysarthria.  Patient being admitted 

for TIA symptoms.

















Medications and Allergies


                                    Allergies











Allergy/AdvReac Type Severity Reaction Status Date / Time


 


No Known Allergies Allergy   Unverified 08/27/22 11:02











                                Home Medications











 Medication  Instructions  Recorded  Confirmed  Last Taken  Type


 


Atorvastatin [Lipitor Tab] 80 mg PO QHS 08/28/22 08/28/22 Unknown History


 


Clopidogrel [Plavix] 75 mg PO QDAY 08/28/22 08/28/22 Unknown History


 


Latanoprost 0.005% [Xalatan 0.005%] 1 drop OU QHS 08/28/22 08/28/22 Unknown 

History











Active Meds: 


Active Medications





Acetaminophen (Acetaminophen 325 Mg Tab)  650 mg PO Q4H PRN


   PRN Reason: Pain MILD(1-3)/Fever >100.5/HA


Acetaminophen (Acetaminophen 325 Mg Tab)  650 mg PO Q4H PRN


   PRN Reason: Pain MILD(1-3)/Fever >100.5/HA


Famotidine (Famotidine 20 Mg/2 Ml Inj)  20 mg IV BID [facility]


   Last Admin: 08/27/22 22:48 Dose:  20 mg


   


Sodium Chloride (Nacl 0.9% 1000 Ml)  1,000 mls @ 75 mls/hr IV AS DIRECT [facility]


   Last Admin: 08/27/22 22:47 Dose:  75 mls/hr


   


Metoclopramide HCl (Metoclopramide 10 Mg/2 Ml Inj)  10 mg IV Q6H PRN


   PRN Reason: Nausea And Vomiting


Morphine Sulfate (Morphine 2 Mg/1 Ml Inj)  2 mg IV Q4H PRN


   PRN Reason: Pain, Moderate (4-6)


Ondansetron HCl (Ondansetron 4 Mg/2 Ml Inj)  4 mg IV Q8H PRN


   PRN Reason: Nausea And Vomiting


Ondansetron HCl (Ondansetron 4 Mg/2 Ml Inj)  4 mg IV Q8H PRN


   PRN Reason: Nausea And Vomiting


Sodium Chloride (Sodium Chloride 0.9% 10 Ml Flush Syringe)  10 ml IV BID [facility]


   Last Admin: 08/27/22 22:48 Dose:  10 ml


   


Sodium Chloride (Sodium Chloride 0.9% 10 Ml Flush Syringe)  10 ml IV PRN PRN


   PRN Reason: LINE FLUSH


Sodium Chloride (Sodium Chloride 0.9% 10 Ml Flush Syringe)  10 ml IV BID [facility]


   Last Admin: 08/27/22 22:33 Dose:  Not Given


   


Sodium Chloride (Sodium Chloride 0.9% 10 Ml Flush Syringe)  10 ml IV PRN PRN


   PRN Reason: LINE FLUSH











Exam





- Constitutional


Vitals: 


                                        











Temp Pulse Resp BP Pulse Ox


 


 99.0 F   78   22   130/57   97 


 


 08/28/22 04:30  08/28/22 04:30  08/28/22 04:30  08/28/22 04:30  08/28/22 04:30











General appearance: Present: no acute distress, well-nourished





- EENT


Eyes: Present: PERRL


ENT: hearing intact, clear oral mucosa





- Neck


Neck: Present: supple, normal ROM





- Respiratory


Respiratory effort: normal


Respiratory: bilateral: CTA





- Cardiovascular


Heart rate: 75


Rhythm: regular


Heart Sounds: Present: S1 & S2.  Absent: rub, click





- Extremities


Extremities: no ischemia, pulses intact, pulses symmetrical, No edema


Peripheral Pulses: within normal limits





- Abdominal


General gastrointestinal: Present: soft, non-tender, non-distended, normal bowel

sounds


Male genitourinary: Present: normal





- Rectal


Rectal Exam: deferred





- Integumentary


Integumentary: Present: clear, warm, dry





- Musculoskeletal


Musculoskeletal: gait normal, strength equal bilaterally





- Psychiatric


Psychiatric: appropriate mood/affect, intact judgment & insight





- Neurologic


Neurologic: CNII-XII intact, moves all extremities (Pulmonary 5/5 power in the 

left upper and left lower extremity.  Right side is normal.)





Results





- Labs


CBC & Chem 7: 


                                 08/27/22 12:35





                                 08/27/22 12:35


Labs: 


                             Laboratory Last Values











WBC  9.5 K/mm3 (4.5-11.0)   08/27/22  12:35    


 


RBC  2.95 M/mm3 (3.65-5.03)  L  08/27/22  12:35    


 


Hgb  8.0 gm/dl (11.8-15.2)  L  08/27/22  12:35    


 


Hct  24.6 % (35.5-45.6)  L  08/27/22  12:35    


 


MCV  83 fl (84-94)  L  08/27/22  12:35    


 


MCH  27 pg (28-32)  L  08/27/22  12:35    


 


MCHC  32 % (32-34)   08/27/22  12:35    


 


RDW  15.6 % (13.2-15.2)  H  08/27/22  12:35    


 


Plt Count  300 K/mm3 (140-440)   08/27/22  12:35    


 


Lymph % (Auto)  7.5 % (13.4-35.0)  L  08/27/22  12:35    


 


Mono % (Auto)  6.7 % (0.0-7.3)   08/27/22  12:35    


 


Eos % (Auto)  0.4 % (0.0-4.3)   08/27/22  12:35    


 


Baso % (Auto)  0.1 % (0.0-1.8)   08/27/22  12:35    


 


Lymph # (Auto)  0.7 K/mm3 (1.2-5.4)  L  08/27/22  12:35    


 


Mono # (Auto)  0.6 K/mm3 (0.0-0.8)   08/27/22  12:35    


 


Eos # (Auto)  0.0 K/mm3 (0.0-0.4)   08/27/22  12:35    


 


Baso # (Auto)  0.0 K/mm3 (0.0-0.1)   08/27/22  12:35    


 


Seg Neutrophils %  85.3 % (40.0-70.0)  H  08/27/22  12:35    


 


Seg Neutrophils #  8.1 K/mm3 (1.8-7.7)  H  08/27/22  12:35    


 


PT  15.1 Sec. (12.2-14.9)  H  08/27/22  12:35    


 


INR  1.07  (0.87-1.13)   08/27/22  12:35    


 


APTT  30.1 Sec. (24.2-36.6)   08/27/22  12:35    


 


Thrombin Time  18.5 Sec. (15.1-19.6)   08/27/22  12:35    


 


Sodium  142 mmol/L (137-145)   08/27/22  12:35    


 


Potassium  4.2 mmol/L (3.6-5.0)   08/27/22  12:35    


 


Chloride  108.4 mmol/L ()  H  08/27/22  12:35    


 


Carbon Dioxide  25 mmol/L (22-30)   08/27/22  12:35    


 


Anion Gap  13 mmol/L  08/27/22  12:35    


 


BUN  19 mg/dL (9-20)   08/27/22  12:35    


 


Creatinine  1.0 mg/dL (0.8-1.3)   08/27/22  12:35    


 


Estimated GFR  > 60 ml/min  08/27/22  12:35    


 


BUN/Creatinine Ratio  19 %  08/27/22  12:35    


 


Glucose  94 mg/dL ()   08/27/22  12:35    


 


Calcium  8.3 mg/dL (8.4-10.2)  L  08/27/22  12:35    








                                    Short CBC











  08/27/22 Range/Units





  12:35 


 


WBC  9.5  (4.5-11.0)  K/mm3


 


Hgb  8.0 L  (11.8-15.2)  gm/dl


 


Hct  24.6 L  (35.5-45.6)  %


 


Plt Count  300  (140-440)  K/mm3








                                       BMP











  08/27/22





  12:35


 


Sodium  142


 


Potassium  4.2


 


Chloride  108.4 H


 


Carbon Dioxide  25


 


BUN  19


 


Creatinine  1.0


 


Glucose  94


 


Calcium  8.3 L














- Imaging and Cardiology


Imaging and Cardiology: 





Head CT


Remote right posterior cerebral artery infarction involving the medial aspect of

the right occipital lobe


Multifocal bilateral thalamic and basal ganglia small deep infarctions this is 

likely remote


If acute in from infarction is of clinical concern consider MRI brain with 

diffusion-weighted imaging for further evaluation.





Head CTA


And neck CTA


No indication for hemodynamically significant intracranial stenosis or large 

vessel occlusion.


Rivers/IV: 


                                        





Voiding Method                   Condom Catheter











Assessment and Plan


Advance Directives: Yes (Full code)


VTE prophylaxis?: Chemical


Plan of care discussed with patient/family: Yes





- Patient Problems


(1) TIA (transient ischemic attack)


Current Visit: Yes   Status: Acute   


Plan to address problem: 


Patient being admitted for observation


Left-sided weakness is resolved during my exam


Patient had a full work-up at [facility] but is not available


No MRI with this point


Possible discharge tomorrow


Patient on aspirin and Plavix








(2) Hyperlipidemia


Current Visit: Yes   Status: Chronic   


Qualifiers: 


   Hyperlipidemia type: mixed hyperlipidemia   Qualified Code(s): E78.2 - Mixed 

hyperlipidemia   


Plan to address problem: 


Patient on high intensity statins


Continue the same








(3) Anemia


Current Visit: Yes   Status: Chronic   


Qualifiers: 


   Anemia type: unspecified type   Qualified Code(s): D64.9 - Anemia, 

unspecified   


Plan to address problem: 


Anemia work-up


Ferrous sulfate at discharge








(4) Old cerebrovascular accident without late effect


Current Visit: Yes   Status: Chronic   


Plan to address problem: 


Patient uses walker


Strength is normal and both upper and lower extremities


Reevaluate








(5) Hypocalcemia


Current Visit: Yes   Status: Chronic   


Plan to address problem: 


Mild


And vitamin D and calcium= Caltrate daily plus twice daily








(6) DVT prophylaxis


Current Visit: Yes   Status: Acute   


Plan to address problem: 


On heparin and GI prophylaxis








(7) Advance care planning


Current Visit: Yes   Status: Acute   


Plan to address problem: 


Disease education conducted, care plan discussed, diagnosis discussed and 

prognosis discussed.  Patient is full code.  Patient acknowledged understanding 

with care plan.  +30 minutes.

## 2022-09-08 ENCOUNTER — OFFICE VISIT (OUTPATIENT)
Dept: URBAN - METROPOLITAN AREA CLINIC 27 | Facility: CLINIC | Age: 87
End: 2022-09-08